# Patient Record
Sex: FEMALE | Race: WHITE | HISPANIC OR LATINO | URBAN - METROPOLITAN AREA
[De-identification: names, ages, dates, MRNs, and addresses within clinical notes are randomized per-mention and may not be internally consistent; named-entity substitution may affect disease eponyms.]

---

## 2022-12-21 ENCOUNTER — INPATIENT (INPATIENT)
Facility: HOSPITAL | Age: 27
LOS: 1 days | Discharge: HOME | End: 2022-12-23
Attending: SPECIALIST | Admitting: SPECIALIST
Payer: COMMERCIAL

## 2022-12-21 VITALS — DIASTOLIC BLOOD PRESSURE: 67 MMHG | HEART RATE: 90 BPM | SYSTOLIC BLOOD PRESSURE: 125 MMHG

## 2022-12-21 DIAGNOSIS — O26.899 OTHER SPECIFIED PREGNANCY RELATED CONDITIONS, UNSPECIFIED TRIMESTER: ICD-10-CM

## 2022-12-21 LAB
ALBUMIN SERPL ELPH-MCNC: 2.5 G/DL — LOW (ref 3.3–5)
ALBUMIN SERPL ELPH-MCNC: 3.3 G/DL — SIGNIFICANT CHANGE UP (ref 3.3–5)
ALP SERPL-CCNC: 192 U/L — HIGH (ref 40–120)
ALP SERPL-CCNC: 258 U/L — HIGH (ref 40–120)
ALT FLD-CCNC: 7 U/L — SIGNIFICANT CHANGE UP (ref 4–33)
ALT FLD-CCNC: <5 U/L — LOW (ref 4–33)
AMPHET UR-MCNC: NEGATIVE — SIGNIFICANT CHANGE UP
ANION GAP SERPL CALC-SCNC: 11 MMOL/L — SIGNIFICANT CHANGE UP (ref 7–14)
ANION GAP SERPL CALC-SCNC: 14 MMOL/L — SIGNIFICANT CHANGE UP (ref 7–14)
APPEARANCE UR: ABNORMAL
APTT BLD: 25.3 SEC — LOW (ref 27–36.3)
APTT BLD: 27.5 SEC — SIGNIFICANT CHANGE UP (ref 27–36.3)
AST SERPL-CCNC: 16 U/L — SIGNIFICANT CHANGE UP (ref 4–32)
AST SERPL-CCNC: 17 U/L — SIGNIFICANT CHANGE UP (ref 4–32)
BACTERIA # UR AUTO: ABNORMAL
BARBITURATES UR SCN-MCNC: NEGATIVE — SIGNIFICANT CHANGE UP
BASOPHILS # BLD AUTO: 0.01 K/UL — SIGNIFICANT CHANGE UP (ref 0–0.2)
BASOPHILS # BLD AUTO: 0.01 K/UL — SIGNIFICANT CHANGE UP (ref 0–0.2)
BASOPHILS NFR BLD AUTO: 0.1 % — SIGNIFICANT CHANGE UP (ref 0–2)
BASOPHILS NFR BLD AUTO: 0.1 % — SIGNIFICANT CHANGE UP (ref 0–2)
BENZODIAZ UR-MCNC: NEGATIVE — SIGNIFICANT CHANGE UP
BILIRUB SERPL-MCNC: 0.4 MG/DL — SIGNIFICANT CHANGE UP (ref 0.2–1.2)
BILIRUB SERPL-MCNC: 0.4 MG/DL — SIGNIFICANT CHANGE UP (ref 0.2–1.2)
BILIRUB UR-MCNC: NEGATIVE — SIGNIFICANT CHANGE UP
BLD GP AB SCN SERPL QL: NEGATIVE — SIGNIFICANT CHANGE UP
BUN SERPL-MCNC: 6 MG/DL — LOW (ref 7–23)
BUN SERPL-MCNC: 8 MG/DL — SIGNIFICANT CHANGE UP (ref 7–23)
CALCIUM SERPL-MCNC: 8.5 MG/DL — SIGNIFICANT CHANGE UP (ref 8.4–10.5)
CALCIUM SERPL-MCNC: 8.9 MG/DL — SIGNIFICANT CHANGE UP (ref 8.4–10.5)
CHLORIDE SERPL-SCNC: 107 MMOL/L — SIGNIFICANT CHANGE UP (ref 98–107)
CHLORIDE SERPL-SCNC: 111 MMOL/L — HIGH (ref 98–107)
CO2 SERPL-SCNC: 15 MMOL/L — LOW (ref 22–31)
CO2 SERPL-SCNC: 18 MMOL/L — LOW (ref 22–31)
COCAINE METAB.OTHER UR-MCNC: NEGATIVE — SIGNIFICANT CHANGE UP
COLOR SPEC: ABNORMAL
COVID-19 SPIKE DOMAIN AB INTERP: POSITIVE
COVID-19 SPIKE DOMAIN ANTIBODY RESULT: >250 U/ML — HIGH
CREAT ?TM UR-MCNC: 112 MG/DL — SIGNIFICANT CHANGE UP
CREAT SERPL-MCNC: 0.53 MG/DL — SIGNIFICANT CHANGE UP (ref 0.5–1.3)
CREAT SERPL-MCNC: 0.6 MG/DL — SIGNIFICANT CHANGE UP (ref 0.5–1.3)
CREATININE URINE RESULT, DAU: 112 MG/DL — SIGNIFICANT CHANGE UP
DIFF PNL FLD: ABNORMAL
EGFR: 125 ML/MIN/1.73M2 — SIGNIFICANT CHANGE UP
EGFR: 129 ML/MIN/1.73M2 — SIGNIFICANT CHANGE UP
EOSINOPHIL # BLD AUTO: 0 K/UL — SIGNIFICANT CHANGE UP (ref 0–0.5)
EOSINOPHIL # BLD AUTO: 0.01 K/UL — SIGNIFICANT CHANGE UP (ref 0–0.5)
EOSINOPHIL NFR BLD AUTO: 0 % — SIGNIFICANT CHANGE UP (ref 0–6)
EOSINOPHIL NFR BLD AUTO: 0.1 % — SIGNIFICANT CHANGE UP (ref 0–6)
EPI CELLS # UR: 13 /HPF — HIGH (ref 0–5)
FIBRINOGEN PPP-MCNC: 506 MG/DL — HIGH (ref 200–465)
GLUCOSE SERPL-MCNC: 141 MG/DL — HIGH (ref 70–99)
GLUCOSE SERPL-MCNC: 99 MG/DL — SIGNIFICANT CHANGE UP (ref 70–99)
GLUCOSE UR QL: NEGATIVE — SIGNIFICANT CHANGE UP
HBV SURFACE AG SERPL QL IA: SIGNIFICANT CHANGE UP
HCT VFR BLD CALC: 28 % — LOW (ref 34.5–45)
HCT VFR BLD CALC: 36.9 % — SIGNIFICANT CHANGE UP (ref 34.5–45)
HGB BLD-MCNC: 12 G/DL — SIGNIFICANT CHANGE UP (ref 11.5–15.5)
HGB BLD-MCNC: 9.3 G/DL — LOW (ref 11.5–15.5)
HIV 1+2 AB+HIV1 P24 AG SERPL QL IA: SIGNIFICANT CHANGE UP
HYALINE CASTS # UR AUTO: 0 /LPF — SIGNIFICANT CHANGE UP (ref 0–7)
IANC: 4.37 K/UL — SIGNIFICANT CHANGE UP (ref 1.8–7.4)
IANC: 9.46 K/UL — HIGH (ref 1.8–7.4)
IMM GRANULOCYTES NFR BLD AUTO: 0.4 % — SIGNIFICANT CHANGE UP (ref 0–0.9)
IMM GRANULOCYTES NFR BLD AUTO: 0.5 % — SIGNIFICANT CHANGE UP (ref 0–0.9)
INR BLD: 0.97 RATIO — SIGNIFICANT CHANGE UP (ref 0.88–1.16)
INR BLD: 1 RATIO — SIGNIFICANT CHANGE UP (ref 0.88–1.16)
KETONES UR-MCNC: NEGATIVE — SIGNIFICANT CHANGE UP
LDH SERPL L TO P-CCNC: 209 U/L — SIGNIFICANT CHANGE UP (ref 135–225)
LDH SERPL L TO P-CCNC: 210 U/L — SIGNIFICANT CHANGE UP (ref 135–225)
LEUKOCYTE ESTERASE UR-ACNC: ABNORMAL
LYMPHOCYTES # BLD AUTO: 1.8 K/UL — SIGNIFICANT CHANGE UP (ref 1–3.3)
LYMPHOCYTES # BLD AUTO: 14.9 % — SIGNIFICANT CHANGE UP (ref 13–44)
LYMPHOCYTES # BLD AUTO: 2.45 K/UL — SIGNIFICANT CHANGE UP (ref 1–3.3)
LYMPHOCYTES # BLD AUTO: 32.8 % — SIGNIFICANT CHANGE UP (ref 13–44)
MCHC RBC-ENTMCNC: 27.8 PG — SIGNIFICANT CHANGE UP (ref 27–34)
MCHC RBC-ENTMCNC: 27.8 PG — SIGNIFICANT CHANGE UP (ref 27–34)
MCHC RBC-ENTMCNC: 32.5 GM/DL — SIGNIFICANT CHANGE UP (ref 32–36)
MCHC RBC-ENTMCNC: 33.2 GM/DL — SIGNIFICANT CHANGE UP (ref 32–36)
MCV RBC AUTO: 83.8 FL — SIGNIFICANT CHANGE UP (ref 80–100)
MCV RBC AUTO: 85.4 FL — SIGNIFICANT CHANGE UP (ref 80–100)
METHADONE UR-MCNC: NEGATIVE — SIGNIFICANT CHANGE UP
MONOCYTES # BLD AUTO: 0.6 K/UL — SIGNIFICANT CHANGE UP (ref 0–0.9)
MONOCYTES # BLD AUTO: 0.73 K/UL — SIGNIFICANT CHANGE UP (ref 0–0.9)
MONOCYTES NFR BLD AUTO: 6.1 % — SIGNIFICANT CHANGE UP (ref 2–14)
MONOCYTES NFR BLD AUTO: 8 % — SIGNIFICANT CHANGE UP (ref 2–14)
NEUTROPHILS # BLD AUTO: 4.37 K/UL — SIGNIFICANT CHANGE UP (ref 1.8–7.4)
NEUTROPHILS # BLD AUTO: 9.46 K/UL — HIGH (ref 1.8–7.4)
NEUTROPHILS NFR BLD AUTO: 58.5 % — SIGNIFICANT CHANGE UP (ref 43–77)
NEUTROPHILS NFR BLD AUTO: 78.5 % — HIGH (ref 43–77)
NITRITE UR-MCNC: NEGATIVE — SIGNIFICANT CHANGE UP
NRBC # BLD: 0 /100 WBCS — SIGNIFICANT CHANGE UP (ref 0–0)
NRBC # BLD: 0 /100 WBCS — SIGNIFICANT CHANGE UP (ref 0–0)
NRBC # FLD: 0 K/UL — SIGNIFICANT CHANGE UP (ref 0–0)
NRBC # FLD: 0 K/UL — SIGNIFICANT CHANGE UP (ref 0–0)
OPIATES UR-MCNC: NEGATIVE — SIGNIFICANT CHANGE UP
OXYCODONE UR-MCNC: NEGATIVE — SIGNIFICANT CHANGE UP
PCP SPEC-MCNC: SIGNIFICANT CHANGE UP
PCP UR-MCNC: NEGATIVE — SIGNIFICANT CHANGE UP
PH UR: 6 — SIGNIFICANT CHANGE UP (ref 5–8)
PLATELET # BLD AUTO: 146 K/UL — LOW (ref 150–400)
PLATELET # BLD AUTO: 175 K/UL — SIGNIFICANT CHANGE UP (ref 150–400)
POTASSIUM SERPL-MCNC: 3.6 MMOL/L — SIGNIFICANT CHANGE UP (ref 3.5–5.3)
POTASSIUM SERPL-MCNC: 3.9 MMOL/L — SIGNIFICANT CHANGE UP (ref 3.5–5.3)
POTASSIUM SERPL-SCNC: 3.6 MMOL/L — SIGNIFICANT CHANGE UP (ref 3.5–5.3)
POTASSIUM SERPL-SCNC: 3.9 MMOL/L — SIGNIFICANT CHANGE UP (ref 3.5–5.3)
PROT ?TM UR-MCNC: 23 MG/DL — SIGNIFICANT CHANGE UP
PROT SERPL-MCNC: 5.5 G/DL — LOW (ref 6–8.3)
PROT SERPL-MCNC: 6.9 G/DL — SIGNIFICANT CHANGE UP (ref 6–8.3)
PROT UR-MCNC: ABNORMAL
PROT/CREAT UR-RTO: 0.2 RATIO — SIGNIFICANT CHANGE UP (ref 0–0.2)
PROTHROM AB SERPL-ACNC: 11.2 SEC — SIGNIFICANT CHANGE UP (ref 10.5–13.4)
PROTHROM AB SERPL-ACNC: 11.6 SEC — SIGNIFICANT CHANGE UP (ref 10.5–13.4)
RBC # BLD: 3.34 M/UL — LOW (ref 3.8–5.2)
RBC # BLD: 4.32 M/UL — SIGNIFICANT CHANGE UP (ref 3.8–5.2)
RBC # FLD: 14.7 % — HIGH (ref 10.3–14.5)
RBC # FLD: 14.9 % — HIGH (ref 10.3–14.5)
RBC CASTS # UR COMP ASSIST: 425 /HPF — HIGH (ref 0–4)
RH IG SCN BLD-IMP: POSITIVE — SIGNIFICANT CHANGE UP
RH IG SCN BLD-IMP: POSITIVE — SIGNIFICANT CHANGE UP
SARS-COV-2 IGG+IGM SERPL QL IA: >250 U/ML — HIGH
SARS-COV-2 IGG+IGM SERPL QL IA: POSITIVE
SARS-COV-2 RNA SPEC QL NAA+PROBE: SIGNIFICANT CHANGE UP
SODIUM SERPL-SCNC: 136 MMOL/L — SIGNIFICANT CHANGE UP (ref 135–145)
SODIUM SERPL-SCNC: 140 MMOL/L — SIGNIFICANT CHANGE UP (ref 135–145)
SP GR SPEC: 1.02 — SIGNIFICANT CHANGE UP (ref 1.01–1.05)
T PALLIDUM AB TITR SER: NEGATIVE — SIGNIFICANT CHANGE UP
THC UR QL: NEGATIVE — SIGNIFICANT CHANGE UP
URATE SERPL-MCNC: 7 MG/DL — SIGNIFICANT CHANGE UP (ref 2.5–7)
URATE SERPL-MCNC: 7.4 MG/DL — HIGH (ref 2.5–7)
UROBILINOGEN FLD QL: SIGNIFICANT CHANGE UP
WBC # BLD: 12.05 K/UL — HIGH (ref 3.8–10.5)
WBC # BLD: 7.48 K/UL — SIGNIFICANT CHANGE UP (ref 3.8–10.5)
WBC # FLD AUTO: 12.05 K/UL — HIGH (ref 3.8–10.5)
WBC # FLD AUTO: 7.48 K/UL — SIGNIFICANT CHANGE UP (ref 3.8–10.5)
WBC UR QL: 14 /HPF — HIGH (ref 0–5)

## 2022-12-21 PROCEDURE — 59409 OBSTETRICAL CARE: CPT | Mod: U9

## 2022-12-21 PROCEDURE — 59025 FETAL NON-STRESS TEST: CPT | Mod: 26

## 2022-12-21 PROCEDURE — 76815 OB US LIMITED FETUS(S): CPT | Mod: 26

## 2022-12-21 RX ORDER — SODIUM CHLORIDE 9 MG/ML
300 INJECTION INTRAMUSCULAR; INTRAVENOUS; SUBCUTANEOUS ONCE
Refills: 0 | Status: COMPLETED | OUTPATIENT
Start: 2022-12-21 | End: 2022-12-21

## 2022-12-21 RX ORDER — HYDROCORTISONE 1 %
1 OINTMENT (GRAM) TOPICAL EVERY 6 HOURS
Refills: 0 | Status: DISCONTINUED | OUTPATIENT
Start: 2022-12-21 | End: 2022-12-23

## 2022-12-21 RX ORDER — INFLUENZA VIRUS VACCINE 15; 15; 15; 15 UG/.5ML; UG/.5ML; UG/.5ML; UG/.5ML
0.5 SUSPENSION INTRAMUSCULAR ONCE
Refills: 0 | Status: DISCONTINUED | OUTPATIENT
Start: 2022-12-21 | End: 2022-12-23

## 2022-12-21 RX ORDER — SODIUM CHLORIDE 9 MG/ML
1000 INJECTION, SOLUTION INTRAVENOUS ONCE
Refills: 0 | Status: DISCONTINUED | OUTPATIENT
Start: 2022-12-21 | End: 2022-12-21

## 2022-12-21 RX ORDER — CHLORHEXIDINE GLUCONATE 213 G/1000ML
1 SOLUTION TOPICAL ONCE
Refills: 0 | Status: DISCONTINUED | OUTPATIENT
Start: 2022-12-21 | End: 2022-12-21

## 2022-12-21 RX ORDER — MAGNESIUM HYDROXIDE 400 MG/1
30 TABLET, CHEWABLE ORAL
Refills: 0 | Status: DISCONTINUED | OUTPATIENT
Start: 2022-12-21 | End: 2022-12-23

## 2022-12-21 RX ORDER — SODIUM CHLORIDE 9 MG/ML
1000 INJECTION, SOLUTION INTRAVENOUS ONCE
Refills: 0 | Status: COMPLETED | OUTPATIENT
Start: 2022-12-21 | End: 2022-12-21

## 2022-12-21 RX ORDER — OXYTOCIN 10 UNIT/ML
333.33 VIAL (ML) INJECTION
Qty: 20 | Refills: 0 | Status: DISCONTINUED | OUTPATIENT
Start: 2022-12-21 | End: 2022-12-23

## 2022-12-21 RX ORDER — IBUPROFEN 200 MG
600 TABLET ORAL EVERY 6 HOURS
Refills: 0 | Status: DISCONTINUED | OUTPATIENT
Start: 2022-12-21 | End: 2022-12-23

## 2022-12-21 RX ORDER — ACETAMINOPHEN 500 MG
975 TABLET ORAL
Refills: 0 | Status: DISCONTINUED | OUTPATIENT
Start: 2022-12-21 | End: 2022-12-23

## 2022-12-21 RX ORDER — SODIUM CHLORIDE 9 MG/ML
1000 INJECTION INTRAMUSCULAR; INTRAVENOUS; SUBCUTANEOUS
Refills: 0 | Status: DISCONTINUED | OUTPATIENT
Start: 2022-12-21 | End: 2022-12-23

## 2022-12-21 RX ORDER — LANOLIN
1 OINTMENT (GRAM) TOPICAL EVERY 6 HOURS
Refills: 0 | Status: DISCONTINUED | OUTPATIENT
Start: 2022-12-21 | End: 2022-12-23

## 2022-12-21 RX ORDER — SODIUM CHLORIDE 9 MG/ML
1000 INJECTION, SOLUTION INTRAVENOUS
Refills: 0 | Status: DISCONTINUED | OUTPATIENT
Start: 2022-12-21 | End: 2022-12-21

## 2022-12-21 RX ORDER — OXYCODONE HYDROCHLORIDE 5 MG/1
5 TABLET ORAL
Refills: 0 | Status: DISCONTINUED | OUTPATIENT
Start: 2022-12-21 | End: 2022-12-23

## 2022-12-21 RX ORDER — DIPHENHYDRAMINE HCL 50 MG
25 CAPSULE ORAL EVERY 6 HOURS
Refills: 0 | Status: DISCONTINUED | OUTPATIENT
Start: 2022-12-21 | End: 2022-12-23

## 2022-12-21 RX ORDER — DIBUCAINE 1 %
1 OINTMENT (GRAM) RECTAL EVERY 6 HOURS
Refills: 0 | Status: DISCONTINUED | OUTPATIENT
Start: 2022-12-21 | End: 2022-12-23

## 2022-12-21 RX ORDER — TETANUS TOXOID, REDUCED DIPHTHERIA TOXOID AND ACELLULAR PERTUSSIS VACCINE, ADSORBED 5; 2.5; 8; 8; 2.5 [IU]/.5ML; [IU]/.5ML; UG/.5ML; UG/.5ML; UG/.5ML
0.5 SUSPENSION INTRAMUSCULAR ONCE
Refills: 0 | Status: DISCONTINUED | OUTPATIENT
Start: 2022-12-21 | End: 2022-12-23

## 2022-12-21 RX ORDER — KETOROLAC TROMETHAMINE 30 MG/ML
30 SYRINGE (ML) INJECTION ONCE
Refills: 0 | Status: DISCONTINUED | OUTPATIENT
Start: 2022-12-21 | End: 2022-12-23

## 2022-12-21 RX ORDER — SIMETHICONE 80 MG/1
80 TABLET, CHEWABLE ORAL EVERY 4 HOURS
Refills: 0 | Status: DISCONTINUED | OUTPATIENT
Start: 2022-12-21 | End: 2022-12-23

## 2022-12-21 RX ORDER — PRAMOXINE HYDROCHLORIDE 150 MG/15G
1 AEROSOL, FOAM RECTAL EVERY 4 HOURS
Refills: 0 | Status: DISCONTINUED | OUTPATIENT
Start: 2022-12-21 | End: 2022-12-23

## 2022-12-21 RX ORDER — CITRIC ACID/SODIUM CITRATE 300-500 MG
15 SOLUTION, ORAL ORAL EVERY 6 HOURS
Refills: 0 | Status: DISCONTINUED | OUTPATIENT
Start: 2022-12-21 | End: 2022-12-21

## 2022-12-21 RX ORDER — AER TRAVELER 0.5 G/1
1 SOLUTION RECTAL; TOPICAL EVERY 4 HOURS
Refills: 0 | Status: DISCONTINUED | OUTPATIENT
Start: 2022-12-21 | End: 2022-12-23

## 2022-12-21 RX ORDER — OXYCODONE HYDROCHLORIDE 5 MG/1
5 TABLET ORAL ONCE
Refills: 0 | Status: DISCONTINUED | OUTPATIENT
Start: 2022-12-21 | End: 2022-12-23

## 2022-12-21 RX ORDER — IBUPROFEN 200 MG
600 TABLET ORAL EVERY 6 HOURS
Refills: 0 | Status: COMPLETED | OUTPATIENT
Start: 2022-12-21 | End: 2023-11-19

## 2022-12-21 RX ORDER — SODIUM CHLORIDE 9 MG/ML
3 INJECTION INTRAMUSCULAR; INTRAVENOUS; SUBCUTANEOUS EVERY 8 HOURS
Refills: 0 | Status: DISCONTINUED | OUTPATIENT
Start: 2022-12-21 | End: 2022-12-23

## 2022-12-21 RX ORDER — BENZOCAINE 10 %
1 GEL (GRAM) MUCOUS MEMBRANE EVERY 6 HOURS
Refills: 0 | Status: DISCONTINUED | OUTPATIENT
Start: 2022-12-21 | End: 2022-12-23

## 2022-12-21 RX ADMIN — SODIUM CHLORIDE 1000 MILLILITER(S): 9 INJECTION, SOLUTION INTRAVENOUS at 12:45

## 2022-12-21 RX ADMIN — SODIUM CHLORIDE 600 MILLILITER(S): 9 INJECTION INTRAMUSCULAR; INTRAVENOUS; SUBCUTANEOUS at 12:43

## 2022-12-21 RX ADMIN — SODIUM CHLORIDE 3 MILLILITER(S): 9 INJECTION INTRAMUSCULAR; INTRAVENOUS; SUBCUTANEOUS at 15:17

## 2022-12-21 RX ADMIN — SODIUM CHLORIDE 3 MILLILITER(S): 9 INJECTION INTRAMUSCULAR; INTRAVENOUS; SUBCUTANEOUS at 22:00

## 2022-12-21 NOTE — OB PROVIDER DELIVERY SUMMARY - NSLOWPPHRISK_OBGYN_A_OB
No previous uterine incision/Perez Pregnancy/Less than or equal to 4 previous vaginal births/No known bleeding disorder/No history of postpartum hemorrhage

## 2022-12-21 NOTE — OB RN TRIAGE NOTE - FALL HARM RISK - UNIVERSAL INTERVENTIONS
Bed in lowest position, wheels locked, appropriate side rails in place/Call bell, personal items and telephone in reach/Instruct patient to call for assistance before getting out of bed or chair/Non-slip footwear when patient is out of bed/[place] to call system/Physically safe environment - no spills, clutter or unnecessary equipment/Purposeful Proactive Rounding/Room/bathroom lighting operational, light cord in reach

## 2022-12-21 NOTE — OB PROVIDER H&P - PROBLEM SELECTOR PLAN 1
Admit for Labor at 40wks Admit for Labor at 40wks  D/W Dr. Griffin  Routine orders  Prenatal Labs Sent  GBS negative in HIE  Epidural for pain management  Expectant Management  Covid Swabbed   U Tox Sent

## 2022-12-21 NOTE — OB PROVIDER TRIAGE NOTE - HISTORY OF PRESENT ILLNESS
28y/o  Default @40.6wks presents with painful contractions felt every 7mins, pain scale 10/10.   Reports good fetal movement  Denies LOF/VB  [facility] Patient     Allergies: Denies  Medications: PNV    Denies Medical and Surgical HX  Denies Etoh/Smoke/Drugs/Psy

## 2022-12-21 NOTE — OB NEONATOLOGY/PEDIATRICIAN DELIVERY SUMMARY - NSPEDSNEONOTESA_OBGYN_ALL_OB_FT
Peds called for Category II tracing. 40.6 wk male born via  to a 28 y/o  blood type O+ mother. No significant maternal or prenatal history. PNL -/-/NR/I, GBS - on . AROM at 08:47 () with clear fluids, approx. 4.5 hrs. Baby emerged vigorous, crying, was w/d/s/s with APGARS of 9/9. Delayed cord clamping at 60 seconds. Mom plans to initiate breastfeeding and formula feed, consents circ.  EOS 0.10. Maternal COVID negative. Baby is stable and admitted to NBN.

## 2022-12-21 NOTE — OB RN PATIENT PROFILE - NS_OBGYNHISTORY_OBGYN_ALL_OB_FT
GYN: Cliff  OB:   10/20/2014  7#      AP course uncomplicated  -[facility] Patient  -Patient reports getting monthly GTT testing??  -GBS negative as per patient

## 2022-12-21 NOTE — OB PROVIDER DELIVERY SUMMARY - NSSELHIDDEN_OBGYN_ALL_OB_FT
[NS_DeliveryAttending1_OBGYN_ALL_OB_FT:[id number]],[NS_DeliveryAssist1_OBGYN_ALL_OB_FT:[id number]],[NS_DeliveryAssist2_OBGYN_ALL_OB_FT:[id number]=]

## 2022-12-21 NOTE — PROVIDER CONTACT NOTE (OTHER) - ASSESSMENT
laying: /68 HR 79  sittin/70 HR 81  standin/64   Pt denied any lightheadedness, dizziness, or unwell feelings.

## 2022-12-21 NOTE — OB RN DELIVERY SUMMARY - NS_SEPSISRSKCALC_OBGYN_ALL_OB_FT
EOS calculated successfully. EOS Risk Factor: 0.5/1000 live births ([facility] national incidence); GA=40w6d; Temp=98.6; ROM=4.517; GBS='Unknown'; Antibiotics='No antibiotics or any antibiotics < 2 hrs prior to birth'

## 2022-12-21 NOTE — OB PROVIDER DELIVERY SUMMARY - NSPROVIDERDELIVERYNOTE_OBGYN_ALL_OB_FT
Head delivered in OA. Restituted in YUDI. No nuchal. Anterior shoulder was delivered followed by the posterior shoulder and remainder of the body.     Infant passed to the mother. Cord clamping was delayed for 1 minute. Cord clamped and cut. Infant handed to pediatrics at the bedside given category II tracing. Cord gasses obtained via a segment of cord.     Placenta was delivered spontaneously intact. Uterine massage was performed and Oxytocin was given upon delivery of the placenta. Intermittent atony ameliorated w/ bimanual massage and evacuation of clots. Fundus firm at conclusion of delivery.     First degree laceration repaired w/ 2-0 chromic in a running fashion. B/l barrington-urethral repaired w/ interrupted 3-0 Vicryl    Adequate hemostasis. QBL 301cc  Count correct x2.

## 2022-12-21 NOTE — OB PROVIDER TRIAGE NOTE - NS_OBGYNHISTORY_OBGYN_ALL_OB_FT
GYN: Cliff  OB:   10/20/2014  7#      AP course uncomplicated  -[facility] Patient  -Patient reports getting monthly GTT testing??  -GBS negative as per patient

## 2022-12-21 NOTE — OB PROVIDER H&P - NS ATTEND AMEND GEN_ALL_CORE FT
Attending Note   Agree with above   PT presents in labor   PNC at [facility]   Admit for labor   epidural prn   pitocin prn     R Gaby SALGUERO

## 2022-12-21 NOTE — OB RN DELIVERY SUMMARY - NSSELHIDDEN_OBGYN_ALL_OB_FT
[NS_DeliveryAttending1_OBGYN_ALL_OB_FT:[id number]],[NS_DeliveryAssist1_OBGYN_ALL_OB_FT:[id number]],[NS_DeliveryAssist2_OBGYN_ALL_OB_FT:[id number]],[NS_DeliveryRN_OBGYN_ALL_OB_FT:[id number]],[NS_CirculateRN2_OBGYN_ALL_OB_FT:[id number]=]

## 2022-12-21 NOTE — OB PROVIDER LABOR PROGRESS NOTE - ASSESSMENT
Cont current management   Pt repositioned  Consider Pitocin augmentation with next exam if spontaneous labor does not progress  ORLANDO Steele
Active labor  Overall reassuring fetal status  Cont current management  Reassess in 2 hrs/prn    ORLANDO Steele
#Labor   - Will manage expectantly    #Fetal Wellbeing   - Cat 2. IUPC placed as above. Will amnioinfusion  - Will bolus and reposition    #Pain Control   - w/ epi    Bismark Arguello, PGY-1    seen and evaluate w/ Dr. Lu

## 2022-12-21 NOTE — OB PROVIDER LABOR PROGRESS NOTE - NS_SUBJECTIVE/OBJECTIVE_OBGYN_ALL_OB_FT
PGY1 Labor & Delivery Progress Note (Entry delayed secondary to clinical duties)     Pt examined @ 1242 due to cat2 tracing    T(C): 36.8 (12-21-22 @ 11:24), Max: 37.0 (12-21-22 @ 02:46)  HR: 90 (12-21-22 @ 13:57) (60 - 133)  BP: 123/59 (12-21-22 @ 13:47) (94/49 - 154/87)  RR: 19 (12-21-22 @ 06:41) (16 - 19)  SpO2: 98% (12-21-22 @ 13:57) (77% - 100%)
Pt seen and examined for labor progress, comfortable with occasional pressure.  VSS  Cat 2 FHR, 130 bpm with nonrecurrently mild variable decelerations in the setting of moderate variability and accelerations  [place]: 4-5  VE: 6-7/80/-2
Pt seen and examined for labor progress, s/p epidural placement and mostly comfortable, some pressure  VSS  Cat 1   [employer]: q2-6  VE: 5/80/-2, AROM for clear fluid

## 2022-12-21 NOTE — OB PROVIDER H&P - NSHPPHYSICALEXAM_GEN_ALL_CORE
Vital Signs Last 24 Hrs  T(C): 37 (21 Dec 2022 02:52), Max: 37.0 (21 Dec 2022 02:46)  T(F): 98.6 (21 Dec 2022 02:52), Max: 98.6 (21 Dec 2022 02:46)  HR: 94 (21 Dec 2022 03:46) (75 - 94)  BP: 129/76 (21 Dec 2022 03:46) (125/67 - 129/76)  RR: 16 (21 Dec 2022 02:52) (16 - 16)    Assessment reveals VSS  General: Female sitting comfortably in no apparent distress  Neuro: No facial asymmetry, no slurred speech, moves all 4 extremities  Mood: Alert and lucid, appropriate mood and affect  A&Ox3  Lungs- clear bilateral  Heart- normal rate and regular rhythm  Extremities- Warm, Dry, no edema present, good pulses   Abdomen soft, NT, gravid  Cat 1 tracing, ctx every 2-8  Transabdominal Ultrasound- vtx  Vaginal Exam- 4.5/80/-3        PLAN: Admit for Labor

## 2022-12-21 NOTE — OB PROVIDER TRIAGE NOTE - NSHPPHYSICALEXAM_GEN_ALL_CORE
Vital Signs Last 24 Hrs  T(C): 37 (21 Dec 2022 02:52), Max: 37.0 (21 Dec 2022 02:46)  T(F): 98.6 (21 Dec 2022 02:52), Max: 98.6 (21 Dec 2022 02:46)  HR: 94 (21 Dec 2022 03:46) (75 - 94)  BP: 129/76 (21 Dec 2022 03:46) (125/67 - 129/76)  RR: 16 (21 Dec 2022 02:52) (16 - 16)    Assessment reveals VSS  General: Female sitting comfortably in no apparent distress  Neuro: No facial asymmetry, no slurred speech, moves all 4 extremities  Mood: Alert and lucid, appropriate mood and affect  A&Ox3  Lungs- clear bilateral  Heart- normal rate and regular rhythm  Extremities- Warm, Dry, no edema present, good pulses   Abdomen soft, NT, gravid  Cat 1 tracing, ctx every 2-8  Transabdominal Ultrasound- vtx  Vaginal Exam- 3/80/-3        PLAN: Re Examine in 2 Hours

## 2022-12-21 NOTE — OB PROVIDER H&P - NS_OBGYNHISTORY_OBGYN_ALL_OB_FT
GYN: Cliff  OB:   10/20/2014  7#      AP course uncomplicated  -[facility] Patient  -Patient reports getting monthly GTT testing??  -GBS negative as per patient

## 2022-12-21 NOTE — OB RN PATIENT PROFILE - FALL HARM RISK - UNIVERSAL INTERVENTIONS
Bed in lowest position, wheels locked, appropriate side rails in place/Call bell, personal items and telephone in reach/Instruct patient to call for assistance before getting out of bed or chair/Non-slip footwear when patient is out of bed/[place] to call system/Physically safe environment - no spills, clutter or unnecessary equipment/Purposeful Proactive Rounding/Room/bathroom lighting operational, light cord in reach

## 2022-12-22 ENCOUNTER — TRANSCRIPTION ENCOUNTER (OUTPATIENT)
Age: 27
End: 2022-12-22

## 2022-12-22 LAB
COVID-19 SPIKE DOMAIN AB INTERP: POSITIVE
COVID-19 SPIKE DOMAIN ANTIBODY RESULT: >250 U/ML — HIGH
HCT VFR BLD CALC: 27.4 % — LOW (ref 34.5–45)
HGB BLD-MCNC: 9 G/DL — LOW (ref 11.5–15.5)
MCHC RBC-ENTMCNC: 27.4 PG — SIGNIFICANT CHANGE UP (ref 27–34)
MCHC RBC-ENTMCNC: 32.8 GM/DL — SIGNIFICANT CHANGE UP (ref 32–36)
MCV RBC AUTO: 83.5 FL — SIGNIFICANT CHANGE UP (ref 80–100)
NRBC # BLD: 0 /100 WBCS — SIGNIFICANT CHANGE UP (ref 0–0)
NRBC # FLD: 0 K/UL — SIGNIFICANT CHANGE UP (ref 0–0)
PLATELET # BLD AUTO: 154 K/UL — SIGNIFICANT CHANGE UP (ref 150–400)
RBC # BLD: 3.28 M/UL — LOW (ref 3.8–5.2)
RBC # FLD: 15.2 % — HIGH (ref 10.3–14.5)
RUBV IGG SER-ACNC: 2.7 INDEX — SIGNIFICANT CHANGE UP
RUBV IGG SER-IMP: POSITIVE — SIGNIFICANT CHANGE UP
SARS-COV-2 IGG+IGM SERPL QL IA: >250 U/ML — HIGH
SARS-COV-2 IGG+IGM SERPL QL IA: POSITIVE
WBC # BLD: 9.12 K/UL — SIGNIFICANT CHANGE UP (ref 3.8–10.5)
WBC # FLD AUTO: 9.12 K/UL — SIGNIFICANT CHANGE UP (ref 3.8–10.5)

## 2022-12-22 RX ORDER — ACETAMINOPHEN 500 MG
3 TABLET ORAL
Qty: 0 | Refills: 0 | DISCHARGE
Start: 2022-12-22

## 2022-12-22 RX ORDER — IBUPROFEN 200 MG
1 TABLET ORAL
Qty: 0 | Refills: 0 | DISCHARGE
Start: 2022-12-22

## 2022-12-22 RX ADMIN — SODIUM CHLORIDE 3 MILLILITER(S): 9 INJECTION INTRAMUSCULAR; INTRAVENOUS; SUBCUTANEOUS at 16:47

## 2022-12-22 NOTE — DISCHARGE NOTE OB - CARE PLAN
1 Principal Discharge DX:	Vaginal delivery  Assessment and plan of treatment:	After discharge, please stay on pelvic rest for 6 weeks, meaning no sexual intercourse, no tampons and no douching.  No driving for 2 weeks as women can loose a lot of blood during delivery and there is a possibility of being lightheaded/fainting.  No lifting objects heavier than baby for two weeks.  Expect to have vaginal bleeding/spotting for up to six weeks. The bleeding should get lighter and more white/light brown with time.  For bleeding soaking more than a pad an hour or passing clots greater than the size of your fist, come in to the emergency department.    Follow up with your doctor in 6 weeks.  Secondary Diagnosis:	Gestational hypertension  Assessment and plan of treatment:	Please obtain a blood pressure cuff. A prescription was sent to your pharmacy. Take your blood pressure two times a day at the same time. Call the clinic if your blood pressure if greater than 140 systolic (top number) and/or 90 diastolic (bottom number). Please go to the hospital if the systolic (top number) is 160 or greater and/or if the diastolic (bottom number) is 110 or greater. Follow-up with your Ob/Gyn in 2-3 days for a blood pressure check    Call the clinic and/or go the hospital if you experience a headache not relieved by medication, severe abdominal pain, new onset changes in vision, worsening of lower extremity swelling or edema.

## 2022-12-22 NOTE — DISCHARGE NOTE OB - PLAN OF CARE
After discharge, please stay on pelvic rest for 6 weeks, meaning no sexual intercourse, no tampons and no douching.  No driving for 2 weeks as women can loose a lot of blood during delivery and there is a possibility of being lightheaded/fainting.  No lifting objects heavier than baby for two weeks.  Expect to have vaginal bleeding/spotting for up to six weeks. The bleeding should get lighter and more white/light brown with time.  For bleeding soaking more than a pad an hour or passing clots greater than the size of your fist, come in to the emergency department.    Follow up with your doctor in 6 weeks. Please obtain a blood pressure cuff. A prescription was sent to your pharmacy. Take your blood pressure two times a day at the same time. Call the clinic if your blood pressure if greater than 140 systolic (top number) and/or 90 diastolic (bottom number). Please go to the hospital if the systolic (top number) is 160 or greater and/or if the diastolic (bottom number) is 110 or greater. Follow-up with your Ob/Gyn in 2-3 days for a blood pressure check    Call the clinic and/or go the hospital if you experience a headache not relieved by medication, severe abdominal pain, new onset changes in vision, worsening of lower extremity swelling or edema.

## 2022-12-22 NOTE — CHART NOTE - NSCHARTNOTEFT_GEN_A_CORE
Delayed documentation 2/2 to clinical duties  Patient seen approximately 9pm on  due to results of STAT CBC with H/H 9.3/28.0    Patient with failed orthostatics on L&D by HR, per RN repeat orthostatics wnl - not documented in chart    Upon arrival to room, patient resting comfortably holding .  Endorses feeling well.  Denies lightheadedness, dizziness, chest pain, shortness of breath, palpitations.  Reports she was asymptomatic as well when orthostatics were taken on L&D.      ICU Vital Signs Last 24 Hrs  T(C): 36.4 (22 Dec 2022 02:00), Max: 37.1 (21 Dec 2022 19:20)  T(F): 97.6 (22 Dec 2022 02:00), Max: 98.8 (21 Dec 2022 19:20)  HR: 80 (22 Dec 2022 02:00) (60 - 134)  BP: 125/70 (22 Dec 2022 02:00) (94/49 - 160/81)  BP(mean): --  ABP: --  ABP(mean): --  RR: 17 (22 Dec 2022 02:00) (16 - 19)  SpO2: 100% (22 Dec 2022 02:00) (77% - 100%)    O2 Parameters below as of 22 Dec 2022 02:00  Patient On (Oxygen Delivery Method): room air        Exam:  Gen: NAD  CV: RRR  Lungs: CTAB  Abd: soft, nondistended, nontender to palpation, fundus firm  : minimal bleeding noted on pad                 9.3    12.05 )-----------( 146      (  @ 18:32 )             28.0                12.0   7.48  )-----------( 175      ( 12-21 @ 06:05 )             36.9       A/P: Patient is PPD#0 from ,  with drop in H/H on CBC  - Asymptomatic at this time  - Repeat CBC in AM  - Patient to alert staff if symptoms develop    Discussed with Dr. Pierce, Service Attending  RILEY Lancaster PGY1
1410    Health records obtained and reviewed. Notable for the following:  - Abnormal 3hr GTT w/ x1 abnormal value  - Asymptomatic bacteruria in 2022 for which she did not want to take abx. Negative Ucx thereafter in July  - Non-adherent to care between 21-31wga    Patient does not want a circumcision for her     Bismark Arguello  PGY-1, Obstetrics & Gynecology

## 2022-12-22 NOTE — DISCHARGE NOTE OB - HOSPITAL COURSE
Patient was admitted to L+D for labor ***    , Pt had an uncomplicated  followed by an uncomplicated postpartum course.  EBL:  Hct:  On Postpartum day 2, patient was discharged home in stable condition, voiding spontaneously, pain well controlled, ambulating, tolerating PO and with normal vital signs. Patient's postpartum birth control plan is ___________. Pt plans to follow up in the [facility] in 6 weeks. Telephone number and clinic information provided prior to discharge.  Patient was admitted to L+D for labor. Of note, patient had received her PNC elsewhere for which a Utox was obtained and was negative. Patient met criteria for gHTN postpartum.   EBL:  Hct:  On Postpartum day 2, patient was discharged home in stable condition, voiding spontaneously, pain well controlled, ambulating, tolerating PO and with normal vital signs. Patient's postpartum birth control plan is *** Pt plans to follow up in the [facility] in 6 weeks. Telephone number and clinic information provided prior to discharge.  Patient was admitted to L+D for labor. Of note, patient had received her PNC elsewhere for which a Utox was obtained and was negative. Patient met criteria for gHTN postpartum.   QBL: 301cc  Hct: 36.9->28->27.4  On Postpartum day 2, patient was discharged home in stable condition, voiding spontaneously, pain well controlled, ambulating, tolerating PO and with normal vital signs. Defer postpartum contraception. Pt plans to follow up in the [facility] in 6 weeks w/ BP check next week. Telephone number and clinic information provided prior to discharge.

## 2022-12-22 NOTE — PROGRESS NOTE ADULT - ASSESSMENT
A/P: 26yo PPD#1 s/p  c/b gHTN. Patient is stable and doing well post-partum.   - Pain well controlled, continue current pain regimen  - Increase ambulation  - Continue regular diet  - Will follow-up AM CBC. Patient asymptomatic at time of evaluation this AM and VS are reassuring    #gHTN  - Asx  - BPs 125-139/70-80s  - Given the opportunity to ask/clarify diagnosis. All questions were answered to the patient's apparent satisfaction     #Post-partum  - Is still undecided about contraception     Bismark Arguello, PGY-1  Ob/Gyn

## 2022-12-22 NOTE — DISCHARGE NOTE OB - MEDICATION SUMMARY - MEDICATIONS TO TAKE
I will START or STAY ON the medications listed below when I get home from the hospital:    Blood pressure cuff   -- 1 application implant 2 times a day   -- Indication: For Gestational hypertension    ibuprofen 600 mg oral tablet  -- 1 tab(s) by mouth every 6 hours  -- Indication: For Pain    acetaminophen 325 mg oral tablet  -- 3 tab(s) by mouth every 6 hours  -- Indication: For Pain    ferrous sulfate 325 mg (65 mg elemental iron) oral tablet  -- 1 tab(s) by mouth 3 times a day  -- Indication: For Blood count    PNV Prenatal oral tablet  -- 1 tab(s) by mouth once a day  -- Indication: For Recovery

## 2022-12-22 NOTE — DISCHARGE NOTE OB - PATIENT PORTAL LINK FT
You can access the FollowMyHealth Patient Portal offered by [facility] by registering at the following website: [email or web address]. By joining [facility]’s FollowMyHealth portal, you will also be able to view your health information using other applications (apps) compatible with our system.

## 2022-12-22 NOTE — DISCHARGE NOTE OB - COMMUNITY RESOURCE CONTACT NUMBER:
Patient to call and schedule baby's first visit appointment at [facility] Division of General Pediatrics [address], [address], [facility]  [phone] so that baby is evaluated by pediatrician 1 to 2 days after hospital discharge.

## 2022-12-22 NOTE — DISCHARGE NOTE OB - COMMUNITY RESOURCE NAME:
Patient to call and schedule postpartum visit for 4 to 6 weeks after delivery date  at Ambulatory care Unit-Oncology Building, Level C, [facility] [phone].

## 2022-12-22 NOTE — DISCHARGE NOTE OB - NSDCCONDITION_GEN_ALL_CORE
INCOMPLETE      SURGERY PA NOTE ON BEHALF OF DR. OCASIO / GENERAL SURGERY:    S: Patient seen and examined at bedside.  Patient still without much of an appetite.      MEDICATIONS:  amoxicillin  875 milliGRAM(s)/clavulanate 1 Tablet(s) Oral every 12 hours  metoprolol tartrate 12.5 milliGRAM(s) Oral two times a day  pantoprazole  Injectable 40 milliGRAM(s) IV Push daily  polyethylene glycol 3350 17 Gram(s) Oral daily  risperiDONE   Tablet 2 milliGRAM(s) Oral at bedtime  senna 2 Tablet(s) Oral at bedtime      O:  Vital Signs Last 24 Hrs  T(C): 36.7 (07 Oct 2021 06:13), Max: 36.7 (07 Oct 2021 06:13)  T(F): 98 (07 Oct 2021 06:13), Max: 98 (07 Oct 2021 06:13)  HR: 86 (07 Oct 2021 06:13) (76 - 86)  BP: 109/65 (07 Oct 2021 06:13) (109/65 - 118/68)  BP(mean): --  RR: 18 (07 Oct 2021 06:13) (18 - 18)  SpO2: 94% (07 Oct 2021 06:13) (94% - 95%)    I&O SUMMARY:    10-06-21 @ 07:01  -  10-07-21 @ 07:00  --------------------------------------------------------  IN: 200 mL / OUT: 1000 mL / NET: -800 mL        PHYSICAL EXAM:  Lungs: CTA bilat without W/R/R  Card: S1S2  Abd: Soft, NT, ND.  +BS x 4.  No rebound/guarding.    Ext: Calves soft, NT, without edema bilat    LABS:                        9.9    5.98  )-----------( 156      ( 07 Oct 2021 11:08 )             31.1     10-07    144  |  113<H>  |  8   ----------------------------<  158<H>  3.6   |  24  |  1.40<H>    Ca    7.9<L>      07 Oct 2021 11:08  Phos  2.6     10-06  Mg     1.9     10-06    TPro  5.0<L>  /  Alb  2.1<L>  /  TBili  0.4  /  DBili  x   /  AST  16  /  ALT  9<L>  /  AlkPhos  33<L>  10-06              RADIOLOGY:    A:    P:       SURGERY PA NOTE ON BEHALF OF DR. OCASIO / GENERAL SURGERY:    S: Patient seen and examined at bedside.  Patient still without much of an appetite, though denies any abdominal pain, nausea, or vomiting.  Has not had a bm since admission, denies flatus.  Urinating without issue.  Currently still on IV Zosyn.  Denies fevers, chills, diaphoresis.      MEDICATIONS:  amoxicillin  875 milliGRAM(s)/clavulanate 1 Tablet(s) Oral every 12 hours  metoprolol tartrate 12.5 milliGRAM(s) Oral two times a day  pantoprazole  Injectable 40 milliGRAM(s) IV Push daily  polyethylene glycol 3350 17 Gram(s) Oral daily  risperiDONE   Tablet 2 milliGRAM(s) Oral at bedtime  senna 2 Tablet(s) Oral at bedtime      O:  Vital Signs Last 24 Hrs  T(C): 36.7 (07 Oct 2021 06:13), Max: 36.7 (07 Oct 2021 06:13)  T(F): 98 (07 Oct 2021 06:13), Max: 98 (07 Oct 2021 06:13)  HR: 86 (07 Oct 2021 06:13) (76 - 86)  BP: 109/65 (07 Oct 2021 06:13) (109/65 - 118/68)  RR: 18 (07 Oct 2021 06:13) (18 - 18)  SpO2: 94% (07 Oct 2021 06:13) (94% - 95%)    I&O SUMMARY:    10-06-21 @ 07:01  -  10-07-21 @ 07:00  --------------------------------------------------------  IN: 200 mL / OUT: 1000 mL / NET: -800 mL    PHYSICAL EXAM:  Lungs: CTA bilat without W/R/R  Card: S1S2  Abd: Soft, NT over RUQ or elsewhere throughout, ND.  +BS x 4.  No rebound/guarding.    Ext: Calves soft, NT, without edema bilat    LABS:                        9.9    5.98  )-----------( 156      ( 07 Oct 2021 11:08 )             31.1     10-07    144  |  113<H>  |  8   ----------------------------<  158<H>  3.6   |  24  |  1.40<H>    Ca    7.9<L>      07 Oct 2021 11:08  Phos  2.6     10-06  Mg     1.9     10-06    TPro  5.0<L>  /  Alb  2.1<L>  /  TBili  0.4  /  DBili  x   /  AST  16  /  ALT  9<L>  /  AlkPhos  33<L>  10-06      A:  89-yo male with cholelithiasis, gallbladder wall thickening  HIDA positive for acute cholecystitis       P:  Patient remains asymptomatic, though not with much appetite   WBC remains normal, remains afebrile with stable VS  Continue diet, continue abx per ID  Holding off from any surgical or invasive modalities at this time - will continue abx  Transition to PO abx today - Augmentin 875 BID  Adding Miralax to bowel regimen  Anticipating d/c (possibly with services?) to home 10/8 or 10/9  Patient seen with Dr. Ocasio today   Will follow        SURGERY PA NOTE ON BEHALF OF DR. OCASIO / GENERAL SURGERY:    S: Patient seen and examined at bedside.  Patient still without much of an appetite, though denies any abdominal pain, nausea, or vomiting.  Has not had a bm since admission, denies flatus.  Urinating without issue.  Currently still on IV Zosyn.  Denies fevers, chills, diaphoresis.        MEDICATIONS:  amoxicillin  875 milliGRAM(s)/clavulanate 1 Tablet(s) Oral every 12 hours  metoprolol tartrate 12.5 milliGRAM(s) Oral two times a day  pantoprazole  Injectable 40 milliGRAM(s) IV Push daily  polyethylene glycol 3350 17 Gram(s) Oral daily  risperiDONE   Tablet 2 milliGRAM(s) Oral at bedtime  senna 2 Tablet(s) Oral at bedtime      O:  Vital Signs Last 24 Hrs  T(C): 36.7 (07 Oct 2021 06:13), Max: 36.7 (07 Oct 2021 06:13)  T(F): 98 (07 Oct 2021 06:13), Max: 98 (07 Oct 2021 06:13)  HR: 86 (07 Oct 2021 06:13) (76 - 86)  BP: 109/65 (07 Oct 2021 06:13) (109/65 - 118/68)  RR: 18 (07 Oct 2021 06:13) (18 - 18)  SpO2: 94% (07 Oct 2021 06:13) (94% - 95%)      I&O SUMMARY:    10-06-21 @ 07:01  -  10-07-21 @ 07:00  --------------------------------------------------------  IN: 200 mL / OUT: 1000 mL / NET: -800 mL      PHYSICAL EXAM:  Lungs: CTA bilat without W/R/R  Card: S1S2  Abd: Soft, NT over RUQ or elsewhere throughout, ND.  +BS x 4.  No rebound/guarding.    Ext: Calves soft, NT, without edema bilat      LABS:                        9.9    5.98  )-----------( 156      ( 07 Oct 2021 11:08 )             31.1     10-07    144  |  113<H>  |  8   ----------------------------<  158<H>  3.6   |  24  |  1.40<H>    Ca    7.9<L>      07 Oct 2021 11:08  Phos  2.6     10-06  Mg     1.9     10-06    TPro  5.0<L>  /  Alb  2.1<L>  /  TBili  0.4  /  DBili  x   /  AST  16  /  ALT  9<L>  /  AlkPhos  33<L>  10-06      A:  89-yo male with cholelithiasis, gallbladder wall thickening  HIDA positive for acute cholecystitis       P:  Patient remains asymptomatic, though not with much appetite   WBC remains normal, remains afebrile with stable VS  Continue diet, continue abx per ID  Holding off from any surgical or invasive modalities at this time - will continue abx  Transition to PO abx today - Augmentin 875 BID  Adding Miralax to bowel regimen  Anticipating d/c (possibly with services?) to home 10/8 or 10/9  Patient seen with Dr. Ocasio today   Will follow        Stable

## 2022-12-22 NOTE — DISCHARGE NOTE OB - NS MD DC FALL RISK RISK
For information on Fall & Injury Prevention, visit: [email or web address] OR  [email or web address] OR  https://www.cdc.gov/steadi/patient.html

## 2022-12-22 NOTE — DISCHARGE NOTE OB - MATERIALS PROVIDED
Vaccinations/[facility]  Screening Program/  Immunization Record/Breastfeeding Mother’s Support Group Information/Guide to Postpartum Care/[facility] Hearing Screen Program/Back To Sleep Handout/Shaken Baby Prevention Handout/Breastfeeding Guide and Packet/Birth Certificate Instructions/Tdap Vaccination (VIS Pub Date: 2012)

## 2022-12-22 NOTE — PROGRESS NOTE ADULT - ATTENDING COMMENTS
Associate Chief of L & D (Late entry)   I have met this patient for the first time today.  She is a default from [facility] and was admitted by Dr Reese Sebastian in early labor and delivered by dr Lu     OB Progress Note:  PPD#1    S: 28yo  PPD#1 s/p . Patient denies anuy HA, blurred vision or RUQ tenderness    O:  Vitals:  Vital Signs Last 24 Hrs  T(C): 36.8 (22 Dec 2022 10:00), Max: 37.1 (21 Dec 2022 19:20)  T(F): 98.2 (22 Dec 2022 10:00), Max: 98.8 (21 Dec 2022 19:20)  HR: 95 (22 Dec 2022 10:00) (74 - 134)  BP: 132/74 (22 Dec 2022 10:00) (105/47 - 160/81)  RR: 18 (22 Dec 2022 10:00) (16 - 18)  SpO2: 100% (22 Dec 2022 10:00) (77% - 100%)    Parameters below as of 22 Dec 2022 10:00  Patient On (Oxygen Delivery Method): room air        MEDICATIONS  (STANDING):  acetaminophen     Tablet .. 975 milliGRAM(s) Oral <User Schedule>  diphtheria/tetanus/pertussis (acellular) Vaccine (Adacel) 0.5 milliLiter(s) IntraMuscular once  ibuprofen  Tablet. 600 milliGRAM(s) Oral every 6 hours  influenza   Vaccine 0.5 milliLiter(s) IntraMuscular once  ketorolac   Injectable 30 milliGRAM(s) IV Push once  oxytocin Infusion 333.333 milliUNIT(s)/Min (1000 mL/Hr) IV Continuous <Continuous>  oxytocin Infusion 333.333 milliUNIT(s)/Min (1000 mL/Hr) IV Continuous <Continuous>  prenatal multivitamin 1 Tablet(s) Oral daily  sodium chloride 0.9% lock flush 3 milliLiter(s) IV Push every 8 hours  sodium chloride 0.9%. 1000 milliLiter(s) (125 mL/Hr) IV Continuous <Continuous>      Labs:  Blood type: O Positive  Rubella IgG: RPR: Negative                          9.0<L>   9.12 >-----------< 154    (  @ 05:43 )             27.4<L>                        9.3<L>   12.05<H> >-----------< 146<L>    (  @ 18:32 )             28.0<L>                        12.0   7.48 >-----------< 175    (  @ 06:05 )             36.9    22 @ 18:48      140  |  111<H>  |  6<L>  ----------------------------<  141<H>  3.6   |  15<L>  |  0.53    22 @ 06:05      136  |  107  |  8   ----------------------------<  99  3.9   |  18<L>  |  0.60        Ca    8.5      21 Dec 2022 18:48  Ca    8.9      21 Dec 2022 06:05    TPro  5.5<L>  /  Alb  2.5<L>  /  TBili  0.4  /  DBili  x   /  AST  16  /  ALT  <5<L>  /  AlkPhos  192<H>  22 @ 18:48  TPro  6.9  /  Alb  3.3  /  TBili  0.4  /  DBili  x   /  AST  17  /  ALT  7   /  AlkPhos  258<H>  22 @ 06:05          Physical Exam:    Abdomen: soft, non-tender, non-distended, fundus firm  Vaginal: Lochia scant sutures in situ  Extremities: No erythema/edema    A/P: 28yo PPD#1 s/p  and repair of 1st degree laceration and bilateral periurethral laceration complicated by GHTN    - Pain well controlled, continue current pain regimen  - Increase ambulation, SCDs when not ambulating  - Continue regular diet  - Monitor BP closely    Vero Triana M.D., M.B.A., M.S.

## 2022-12-22 NOTE — DISCHARGE NOTE OB - PROVIDER TOKENS
FREE:[LAST:[[facility]],PHONE:[(   )    -],FAX:[(   )    -],ADDRESS:[[facility], Ambulatory Care Unit  Oncology Building, Basement floor  [address] [place] [address]  [phone]]]

## 2022-12-23 VITALS
TEMPERATURE: 99 F | SYSTOLIC BLOOD PRESSURE: 142 MMHG | RESPIRATION RATE: 17 BRPM | DIASTOLIC BLOOD PRESSURE: 79 MMHG | OXYGEN SATURATION: 98 % | HEART RATE: 90 BPM

## 2022-12-23 RX ORDER — FERROUS SULFATE 325(65) MG
325 TABLET ORAL THREE TIMES A DAY
Refills: 0 | Status: DISCONTINUED | OUTPATIENT
Start: 2022-12-23 | End: 2022-12-23

## 2022-12-23 RX ORDER — SENNA PLUS 8.6 MG/1
1 TABLET ORAL
Refills: 0 | Status: DISCONTINUED | OUTPATIENT
Start: 2022-12-23 | End: 2022-12-23

## 2022-12-23 RX ORDER — FERROUS SULFATE 325(65) MG
1 TABLET ORAL
Qty: 0 | Refills: 0 | DISCHARGE
Start: 2022-12-23

## 2022-12-23 RX ORDER — ASCORBIC ACID 60 MG
500 TABLET,CHEWABLE ORAL DAILY
Refills: 0 | Status: DISCONTINUED | OUTPATIENT
Start: 2022-12-23 | End: 2022-12-23

## 2022-12-23 NOTE — PROGRESS NOTE ADULT - SUBJECTIVE AND OBJECTIVE BOX
OB Progress Note:  PPD#2    S: 27y PPD#2 s/p  c/b gHTN. Pain controlled. Patient tolerating regular diet, voiding spontaneously, and ambulating without difficulty. Denies significant vaginal bleeding, chest pain, shortness of breath, light-headedness/ dizziness, or n/v. Denies any headaches or scotomas    O:  Vitals:   Vital Signs Last 24 Hrs  T(C): 36.4 (23 Dec 2022 06:00), Max: 36.8 (22 Dec 2022 10:00)  T(F): 97.6 (23 Dec 2022 06:00), Max: 98.2 (22 Dec 2022 10:00)  HR: 76 (23 Dec 2022 06:00) (76 - 98)  BP: 128/69 (23 Dec 2022 06:00) (124/74 - 132/74)  BP(mean): --  RR: 18 (23 Dec 2022 06:00) (17 - 18)  SpO2: 99% (23 Dec 2022 06:00) (99% - 100%)    Parameters below as of 23 Dec 2022 06:00  Patient On (Oxygen Delivery Method): room air        MEDICATIONS  (STANDING):  acetaminophen     Tablet .. 975 milliGRAM(s) Oral <User Schedule>  ascorbic acid 500 milliGRAM(s) Oral daily  diphtheria/tetanus/pertussis (acellular) Vaccine (Adacel) 0.5 milliLiter(s) IntraMuscular once  ferrous    sulfate 325 milliGRAM(s) Oral three times a day  ibuprofen  Tablet. 600 milliGRAM(s) Oral every 6 hours  influenza   Vaccine 0.5 milliLiter(s) IntraMuscular once  ketorolac   Injectable 30 milliGRAM(s) IV Push once  prenatal multivitamin 1 Tablet(s) Oral daily  senna 1 Tablet(s) Oral two times a day  sodium chloride 0.9% lock flush 3 milliLiter(s) IV Push every 8 hours    MEDICATIONS  (PRN):  benzocaine 20%/menthol 0.5% Spray 1 Spray(s) Topical every 6 hours PRN for Perineal discomfort  dibucaine 1% Ointment 1 Application(s) Topical every 6 hours PRN Perineal discomfort  diphenhydrAMINE 25 milliGRAM(s) Oral every 6 hours PRN Pruritus  hydrocortisone 1% Cream 1 Application(s) Topical every 6 hours PRN Moderate Pain (4-6)  lanolin Ointment 1 Application(s) Topical every 6 hours PRN nipple soreness  magnesium hydroxide Suspension 30 milliLiter(s) Oral two times a day PRN Constipation  oxyCODONE    IR 5 milliGRAM(s) Oral every 3 hours PRN Moderate to Severe Pain (4-10)  oxyCODONE    IR 5 milliGRAM(s) Oral once PRN Moderate to Severe Pain (4-10)  pramoxine 1%/zinc 5% Cream 1 Application(s) Topical every 4 hours PRN Moderate Pain (4-6)  simethicone 80 milliGRAM(s) Chew every 4 hours PRN Gas  witch hazel Pads 1 Application(s) Topical every 4 hours PRN Perineal discomfort      Labs:  Blood type: O Positive  Rubella IgG: RPR: Negative                          9.0<L>   9.12 >-----------< 154    (  @ 05:43 )             27.4<L>                        9.3<L>   12.05<H> >-----------< 146<L>    (  @ 18:32 )             28.0<L>                        12.0   7.48 >-----------< 175    (  @ 06:05 )             36.9    22 @ 18:48      140  |  111<H>  |  6<L>  ----------------------------<  141<H>  3.6   |  15<L>  |  0.53    22 @ 06:05      136  |  107  |  8   ----------------------------<  99  3.9   |  18<L>  |  0.60        Ca    8.5      21 Dec 2022 18:48  Ca    8.9      21 Dec 2022 06:05    TPro  5.5<L>  /  Alb  2.5<L>  /  TBili  0.4  /  DBili  x   /  AST  16  /  ALT  <5<L>  /  AlkPhos  192<H>  22 @ 18:48  TPro  6.9  /  Alb  3.3  /  TBili  0.4  /  DBili  x   /  AST  17  /  ALT  7   /  AlkPhos  258<H>  12--22 @ 06:05          Physical Exam:  General: No acute distress  Respiratory: No respiratory distress. Unlabored breathing   Abdomen: Soft. Non-tender. Non-distended. Fundus is firm   Extremities: No pitting edema or calf tenderness bilaterally      
OB Progress Note:  PPD#1    S: 28yo PPD#1 s/p  c/b gHTN. Pain controlled. Patient voiding spontaneously and ambulating. Reports not much of an appetite, but has tolerated a regular diet w/o issue. Denies significant VB, chest pain, shortness of breath, n/v, light-headedness/dizziness. Denies any headache or scotomas    Of note, was evaluated overnight after HELLP labs returned notable for drop in CBC. Patient was asymptomatic at time of eval       O:  Vitals:  Vital Signs Last 24 Hrs  T(C): 36.4 (22 Dec 2022 02:00), Max: 37.1 (21 Dec 2022 19:20)  T(F): 97.6 (22 Dec 2022 02:00), Max: 98.8 (21 Dec 2022 19:20)  HR: 80 (22 Dec 2022 02:00) (60 - 134)  BP: 125/70 (22 Dec 2022 02:00) (94/49 - 160/81)  BP(mean): --  RR: 17 (22 Dec 2022 02:00) (17 - 19)  SpO2: 100% (22 Dec 2022 02:00) (77% - 100%)    Parameters below as of 22 Dec 2022 02:00  Patient On (Oxygen Delivery Method): room air        MEDICATIONS  (STANDING):  acetaminophen     Tablet .. 975 milliGRAM(s) Oral <User Schedule>  diphtheria/tetanus/pertussis (acellular) Vaccine (Adacel) 0.5 milliLiter(s) IntraMuscular once  ibuprofen  Tablet. 600 milliGRAM(s) Oral every 6 hours  influenza   Vaccine 0.5 milliLiter(s) IntraMuscular once  ketorolac   Injectable 30 milliGRAM(s) IV Push once  oxytocin Infusion 333.333 milliUNIT(s)/Min (1000 mL/Hr) IV Continuous <Continuous>  oxytocin Infusion 333.333 milliUNIT(s)/Min (1000 mL/Hr) IV Continuous <Continuous>  prenatal multivitamin 1 Tablet(s) Oral daily  sodium chloride 0.9% lock flush 3 milliLiter(s) IV Push every 8 hours  sodium chloride 0.9%. 1000 milliLiter(s) (125 mL/Hr) IV Continuous <Continuous>      Labs:  Blood type: O Positive  Rubella IgG: RPR: Negative                          9.3<L>   12.05<H> >-----------< 146<L>    (  @ 18:32 )             28.0<L>                        12.0   7.48 >-----------< 175    (  @ 06:05 )             36.9    22 @ 18:48      140  |  111<H>  |  6<L>  ----------------------------<  141<H>  3.6   |  15<L>  |  0.53    22 @ 06:05      136  |  107  |  8   ----------------------------<  99  3.9   |  18<L>  |  0.60        Ca    8.5      21 Dec 2022 18:48  Ca    8.9      21 Dec 2022 06:05    TPro  5.5<L>  /  Alb  2.5<L>  /  TBili  0.4  /  DBili  x   /  AST  16  /  ALT  <5<L>  /  AlkPhos  192<H>  22 @ 18:48  TPro  6.9  /  Alb  3.3  /  TBili  0.4  /  DBili  x   /  AST  17  /  ALT  7   /  AlkPhos  258<H>  22 @ 06:05          Physical Exam:  General: No acute distress. Resting in bed comfortably with   Respiratory: No respiratory distress. Unlabored breathing   Abdomen: Soft. Non-tender. Non-distended. Fundus is firm  Extremities: No calf tenderness bilaterally

## 2022-12-23 NOTE — PROGRESS NOTE ADULT - ATTENDING COMMENTS
Associate Chief of L & D (Late entry)      OB Progress Note:  PPD#2    S: 28yo  PPD#2 s/p . Patient denies anuy HA, blurred vision or RUQ tenderness    O:  Vital Signs Last 24 Hrs  T(C): 36.9 (23 Dec 2022 10:10), Max: 36.9 (23 Dec 2022 10:10)  T(F): 98.4 (23 Dec 2022 10:10), Max: 98.4 (23 Dec 2022 10:10)  HR: 85 (23 Dec 2022 10:10) (76 - 98)  BP: 131/67 (23 Dec 2022 10:10) (124/74 - 131/67)  RR: 18 (23 Dec 2022 10:10) (17 - 18)  SpO2: 100% (23 Dec 2022 10:10) (99% - 100%)    Parameters below as of 23 Dec 2022 10:10  Patient On (Oxygen Delivery Method): room air      MEDICATIONS  (STANDING):  acetaminophen     Tablet .. 975 milliGRAM(s) Oral <User Schedule>  diphtheria/tetanus/pertussis (acellular) Vaccine (Adacel) 0.5 milliLiter(s) IntraMuscular once  ibuprofen  Tablet. 600 milliGRAM(s) Oral every 6 hours  influenza   Vaccine 0.5 milliLiter(s) IntraMuscular once  ketorolac   Injectable 30 milliGRAM(s) IV Push once  oxytocin Infusion 333.333 milliUNIT(s)/Min (1000 mL/Hr) IV Continuous <Continuous>  oxytocin Infusion 333.333 milliUNIT(s)/Min (1000 mL/Hr) IV Continuous <Continuous>  prenatal multivitamin 1 Tablet(s) Oral daily  sodium chloride 0.9% lock flush 3 milliLiter(s) IV Push every 8 hours  sodium chloride 0.9%. 1000 milliLiter(s) (125 mL/Hr) IV Continuous <Continuous>      Labs:  Blood type: O Positive  Rubella IgG: RPR: Negative                          9.0<L>   9.12 >-----------< 154    (  @ 05:43 )             27.4<L>                        9.3<L>   12.05<H> >-----------< 146<L>    (  @ 18:32 )             28.0<L>                        12.0   7.48 >-----------< 175    (  @ 06:05 )             36.9    22 @ 18:48      140  |  111<H>  |  6<L>  ----------------------------<  141<H>  3.6   |  15<L>  |  0.53    22 @ 06:05      136  |  107  |  8   ----------------------------<  99  3.9   |  18<L>  |  0.60        Ca    8.5      21 Dec 2022 18:48  Ca    8.9      21 Dec 2022 06:05    TPro  5.5<L>  /  Alb  2.5<L>  /  TBili  0.4  /  DBili  x   /  AST  16  /  ALT  <5<L>  /  AlkPhos  192<H>  22 @ 18:48  TPro  6.9  /  Alb  3.3  /  TBili  0.4  /  DBili  x   /  AST  17  /  ALT  7   /  AlkPhos  258<H>  22 @ 06:05          Physical Exam:    Abdomen: soft, non-tender, non-distended, fundus firm  Vaginal: Lochia scant sutures in situ  Extremities: No erythema/+1 edema    A/P: 28yo PPD#2 s/p  and repair of 1st degree laceration and bilateral periurethral laceration complicated by GHTN    - Patient is stable for discharge and will follow up in the PP clinic  - Monitor BP closely    Vero Triana M.D., M.B.A., M.S. Stable  Cont Iron supplement stable Stable  Cont Iron supplement Stable  Cont Iron supplement stable stable stable stable stable

## 2022-12-23 NOTE — PROGRESS NOTE ADULT - ASSESSMENT
A/P: 26yo PPD#2 s/p  c/b gHTN. Patient is stable and doing well post-partum.    - Pain well controlled, continue current pain regimen  - Increase ambulation  - Continue regular diet    #gHTN  - Asx  - BPs 110-132/60-80s  - Given the opportunity to ask questions regarding dx and follow-up. All questions answered to the patient's apparent satisfaction. BP cuff sent to pharmacy of choice    #Post-partum  - Amendable for discharge today  - Decided to defer contraception  - Wants to follow-up with clinic at [facility]. Information to be provided w/ dc paperwork     Bismark Arguello, PGY-1  Ob/Gyn

## 2023-01-01 ENCOUNTER — RESULT REVIEW (OUTPATIENT)
Age: 28
End: 2023-01-01

## 2023-01-04 PROBLEM — U07.1 COVID-19: Chronic | Status: ACTIVE | Noted: 2022-12-21

## 2023-01-09 PROBLEM — Z00.00 ENCOUNTER FOR PREVENTIVE HEALTH EXAMINATION: Status: ACTIVE | Noted: 2023-01-09

## 2023-01-20 ENCOUNTER — OUTPATIENT (OUTPATIENT)
Dept: OUTPATIENT SERVICES | Facility: HOSPITAL | Age: 28
LOS: 1 days | End: 2023-01-20

## 2023-01-20 ENCOUNTER — RESULT REVIEW (OUTPATIENT)
Age: 28
End: 2023-01-20

## 2023-01-20 ENCOUNTER — APPOINTMENT (OUTPATIENT)
Dept: OBGYN | Facility: HOSPITAL | Age: 28
End: 2023-01-20

## 2023-01-20 VITALS
DIASTOLIC BLOOD PRESSURE: 71 MMHG | WEIGHT: 227.3 LBS | HEIGHT: 65 IN | SYSTOLIC BLOOD PRESSURE: 110 MMHG | HEART RATE: 72 BPM | BODY MASS INDEX: 37.87 KG/M2 | TEMPERATURE: 97.7 F

## 2023-01-20 DIAGNOSIS — Z30.011 ENCOUNTER FOR INITIAL PRESCRIPTION OF CONTRACEPTIVE PILLS: ICD-10-CM

## 2023-01-20 DIAGNOSIS — K59.00 CONSTIPATION, UNSPECIFIED: ICD-10-CM

## 2023-01-20 LAB
BASOPHILS # BLD AUTO: 0.01 K/UL — SIGNIFICANT CHANGE UP (ref 0–0.2)
BASOPHILS NFR BLD AUTO: 0.2 % — SIGNIFICANT CHANGE UP (ref 0–2)
EOSINOPHIL # BLD AUTO: 0.05 K/UL — SIGNIFICANT CHANGE UP (ref 0–0.5)
EOSINOPHIL NFR BLD AUTO: 0.8 % — SIGNIFICANT CHANGE UP (ref 0–6)
HCT VFR BLD CALC: 34 % — LOW (ref 34.5–45)
HGB BLD-MCNC: 10.8 G/DL — LOW (ref 11.5–15.5)
IANC: 2.87 K/UL — SIGNIFICANT CHANGE UP (ref 1.8–7.4)
IMM GRANULOCYTES NFR BLD AUTO: 0.2 % — SIGNIFICANT CHANGE UP (ref 0–0.9)
LYMPHOCYTES # BLD AUTO: 2.85 K/UL — SIGNIFICANT CHANGE UP (ref 1–3.3)
LYMPHOCYTES # BLD AUTO: 45.3 % — HIGH (ref 13–44)
MCHC RBC-ENTMCNC: 26.3 PG — LOW (ref 27–34)
MCHC RBC-ENTMCNC: 31.8 GM/DL — LOW (ref 32–36)
MCV RBC AUTO: 82.7 FL — SIGNIFICANT CHANGE UP (ref 80–100)
MONOCYTES # BLD AUTO: 0.5 K/UL — SIGNIFICANT CHANGE UP (ref 0–0.9)
MONOCYTES NFR BLD AUTO: 7.9 % — SIGNIFICANT CHANGE UP (ref 2–14)
NEUTROPHILS # BLD AUTO: 2.87 K/UL — SIGNIFICANT CHANGE UP (ref 1.8–7.4)
NEUTROPHILS NFR BLD AUTO: 45.6 % — SIGNIFICANT CHANGE UP (ref 43–77)
NRBC # BLD: 0 /100 WBCS — SIGNIFICANT CHANGE UP (ref 0–0)
NRBC # FLD: 0 K/UL — SIGNIFICANT CHANGE UP (ref 0–0)
PLATELET # BLD AUTO: 283 K/UL — SIGNIFICANT CHANGE UP (ref 150–400)
RBC # BLD: 4.11 M/UL — SIGNIFICANT CHANGE UP (ref 3.8–5.2)
RBC # FLD: 14.4 % — SIGNIFICANT CHANGE UP (ref 10.3–14.5)
WBC # BLD: 6.29 K/UL — SIGNIFICANT CHANGE UP (ref 3.8–10.5)
WBC # FLD AUTO: 6.29 K/UL — SIGNIFICANT CHANGE UP (ref 3.8–10.5)

## 2023-01-20 NOTE — HISTORY OF PRESENT ILLNESS
[Postpartum Follow Up] : postpartum follow up [Delivery Date: ___] : on [unfilled] [] : delivered by vaginal delivery [Male] : Delivery History: baby boy [Wt. ___] : weighing [unfilled] [Discharge HCT: ___] : hematocrit level was [unfilled] [Discharge HGB: ___] : hemoglobin level was [unfilled] [Intended Contraception] : Intended Contraception: [Oral Contraceptives] : oral contraceptives [Back to Normal] : is back to normal in size [None] : no vaginal bleeding [Normal] : the vagina was normal [Examination Of The Breasts] : breasts are normal [Doing Well] : is doing well [Resumed Menses] : has not resumed her menses [Resumed Gervais] : has not resumed intercourse [S/Sx PP Depression] : no signs/symptoms of postpartum depression [FreeTextEntry8] : Here for postpartum exam. Prenatal Care at [facility].   Hx   full term 7 lbs [de-identified] : Rx Lessina x 4 months- instructed on use and risks. Considering IUD- info given. [de-identified] : Considering IUD- info given, Urine GC/chlamydia ordered.Bottlefeeding/happy with baby.CBC today. States she kept taking sugar tests at [facility] but was not diabetic. HgA1c today. Contraceptive counseling- wants OCPs. Rx Lessina 1 pack x 4 months, Instructed on use and risks, Complaining of constipation with occasional bloody stool- referred to GI clinic. RTC 3 months family planning. MHegarty NP

## 2023-01-21 LAB
C TRACH RRNA SPEC QL NAA+PROBE: SIGNIFICANT CHANGE UP
N GONORRHOEA RRNA SPEC QL NAA+PROBE: SIGNIFICANT CHANGE UP
SPECIMEN SOURCE: SIGNIFICANT CHANGE UP

## 2023-01-24 DIAGNOSIS — K59.00 CONSTIPATION, UNSPECIFIED: ICD-10-CM

## 2023-01-24 DIAGNOSIS — Z30.011 ENCOUNTER FOR INITIAL PRESCRIPTION OF CONTRACEPTIVE PILLS: ICD-10-CM

## 2023-01-24 DIAGNOSIS — D64.9 ANEMIA, UNSPECIFIED: ICD-10-CM

## 2023-03-21 RX ORDER — LEVONORGESTREL AND ETHINYL ESTRADIOL 0.1-0.02MG
0.1-2 KIT ORAL DAILY
Qty: 1 | Refills: 1 | Status: ACTIVE | COMMUNITY
Start: 2023-01-20 | End: 1900-01-01

## 2023-04-07 NOTE — OB PROVIDER H&P - HISTORY OF PRESENT ILLNESS
Vaccine status unknown
28y/o  Default @40.6wks presents with painful contractions felt every 7mins, pain scale 10/10.   Reports good fetal movement  Denies LOF/VB  [facility] Patient     Allergies: Denies  Medications: PNV    Denies Medical and Surgical HX  Denies Etoh/Smoke/Drugs/Psy

## 2023-05-18 ENCOUNTER — OUTPATIENT (OUTPATIENT)
Dept: OUTPATIENT SERVICES | Facility: HOSPITAL | Age: 28
LOS: 1 days | End: 2023-05-18

## 2023-05-18 ENCOUNTER — APPOINTMENT (OUTPATIENT)
Dept: OBGYN | Facility: HOSPITAL | Age: 28
End: 2023-05-18

## 2023-05-18 VITALS
TEMPERATURE: 98 F | WEIGHT: 225.31 LBS | HEIGHT: 65 IN | BODY MASS INDEX: 37.54 KG/M2 | DIASTOLIC BLOOD PRESSURE: 61 MMHG | HEART RATE: 72 BPM | SYSTOLIC BLOOD PRESSURE: 129 MMHG

## 2023-05-18 DIAGNOSIS — Z78.9 OTHER SPECIFIED HEALTH STATUS: ICD-10-CM

## 2023-05-18 RX ORDER — LEVONORGESTREL AND ETHINYL ESTRADIOL 0.1-0.02MG
0.1-2 KIT ORAL DAILY
Qty: 28 | Refills: 5 | Status: ACTIVE | COMMUNITY
Start: 2023-05-18 | End: 1900-01-01

## 2023-05-18 NOTE — DISCUSSION/SUMMARY
[FreeTextEntry1] : Family planning\par --Lessina refilled for 6 months\par --ACHES and correct usage reviewed\par --alternate forms of birth control reviewed\par --STD counseling, safe sex practices and condoms\par Follow up for annual in 6 months

## 2023-05-18 NOTE — HISTORY OF PRESENT ILLNESS
[FreeTextEntry1] : 27 yo  LMP 5//2023 here for OCP refill.  Taking Lessina without problems.  Denies headaches, chest pain or leg pains.  Reports regular cycles.  Happy with method and wants to continue use.  Monogamous with one male partner.  Hx preeclampsia during labor but normotensive after birth.  Today has normal /61 and lost 2 pounds.

## 2023-05-19 DIAGNOSIS — Z30.09 ENCOUNTER FOR OTHER GENERAL COUNSELING AND ADVICE ON CONTRACEPTION: ICD-10-CM

## 2023-05-19 DIAGNOSIS — Z78.9 OTHER SPECIFIED HEALTH STATUS: ICD-10-CM

## 2023-11-16 ENCOUNTER — RESULT REVIEW (OUTPATIENT)
Age: 28
End: 2023-11-16

## 2023-11-16 ENCOUNTER — APPOINTMENT (OUTPATIENT)
Dept: OBGYN | Facility: HOSPITAL | Age: 28
End: 2023-11-16
Payer: MEDICAID

## 2023-11-16 ENCOUNTER — OUTPATIENT (OUTPATIENT)
Dept: OUTPATIENT SERVICES | Facility: HOSPITAL | Age: 28
LOS: 1 days | End: 2023-11-16

## 2023-11-16 VITALS
DIASTOLIC BLOOD PRESSURE: 62 MMHG | HEART RATE: 79 BPM | SYSTOLIC BLOOD PRESSURE: 111 MMHG | TEMPERATURE: 97.9 F | BODY MASS INDEX: 39.32 KG/M2 | WEIGHT: 236 LBS | HEIGHT: 65 IN

## 2023-11-16 DIAGNOSIS — Z01.419 ENCOUNTER FOR GYNECOLOGICAL EXAMINATION (GENERAL) (ROUTINE) W/OUT ABNORMAL FINDINGS: ICD-10-CM

## 2023-11-16 DIAGNOSIS — Z30.09 ENCOUNTER FOR OTHER GENERAL COUNSELING AND ADVICE ON CONTRACEPTION: ICD-10-CM

## 2023-11-16 LAB
A1C WITH ESTIMATED AVERAGE GLUCOSE RESULT: 5.4 % — SIGNIFICANT CHANGE UP (ref 4–5.6)
A1C WITH ESTIMATED AVERAGE GLUCOSE RESULT: 5.4 % — SIGNIFICANT CHANGE UP (ref 4–5.6)
ALBUMIN SERPL ELPH-MCNC: 3.6 G/DL — SIGNIFICANT CHANGE UP (ref 3.3–5)
ALBUMIN SERPL ELPH-MCNC: 3.6 G/DL — SIGNIFICANT CHANGE UP (ref 3.3–5)
ALP SERPL-CCNC: 100 U/L — SIGNIFICANT CHANGE UP (ref 40–120)
ALP SERPL-CCNC: 100 U/L — SIGNIFICANT CHANGE UP (ref 40–120)
ALT FLD-CCNC: 16 U/L — SIGNIFICANT CHANGE UP (ref 4–33)
ALT FLD-CCNC: 16 U/L — SIGNIFICANT CHANGE UP (ref 4–33)
ANION GAP SERPL CALC-SCNC: 6 MMOL/L — LOW (ref 7–14)
ANION GAP SERPL CALC-SCNC: 6 MMOL/L — LOW (ref 7–14)
AST SERPL-CCNC: 14 U/L — SIGNIFICANT CHANGE UP (ref 4–32)
AST SERPL-CCNC: 14 U/L — SIGNIFICANT CHANGE UP (ref 4–32)
BILIRUB SERPL-MCNC: 0.4 MG/DL — SIGNIFICANT CHANGE UP (ref 0.2–1.2)
BILIRUB SERPL-MCNC: 0.4 MG/DL — SIGNIFICANT CHANGE UP (ref 0.2–1.2)
BUN SERPL-MCNC: 9 MG/DL — SIGNIFICANT CHANGE UP (ref 7–23)
BUN SERPL-MCNC: 9 MG/DL — SIGNIFICANT CHANGE UP (ref 7–23)
CALCIUM SERPL-MCNC: 8.3 MG/DL — LOW (ref 8.4–10.5)
CALCIUM SERPL-MCNC: 8.3 MG/DL — LOW (ref 8.4–10.5)
CHLORIDE SERPL-SCNC: 108 MMOL/L — HIGH (ref 98–107)
CHLORIDE SERPL-SCNC: 108 MMOL/L — HIGH (ref 98–107)
CO2 SERPL-SCNC: 25 MMOL/L — SIGNIFICANT CHANGE UP (ref 22–31)
CO2 SERPL-SCNC: 25 MMOL/L — SIGNIFICANT CHANGE UP (ref 22–31)
CREAT SERPL-MCNC: 0.61 MG/DL — SIGNIFICANT CHANGE UP (ref 0.5–1.3)
CREAT SERPL-MCNC: 0.61 MG/DL — SIGNIFICANT CHANGE UP (ref 0.5–1.3)
EGFR: 124 ML/MIN/1.73M2 — SIGNIFICANT CHANGE UP
EGFR: 124 ML/MIN/1.73M2 — SIGNIFICANT CHANGE UP
ESTIMATED AVERAGE GLUCOSE: 108 — SIGNIFICANT CHANGE UP
ESTIMATED AVERAGE GLUCOSE: 108 — SIGNIFICANT CHANGE UP
GLUCOSE SERPL-MCNC: 87 MG/DL — SIGNIFICANT CHANGE UP (ref 70–99)
GLUCOSE SERPL-MCNC: 87 MG/DL — SIGNIFICANT CHANGE UP (ref 70–99)
HCT VFR BLD CALC: 36.5 % — SIGNIFICANT CHANGE UP (ref 34.5–45)
HCT VFR BLD CALC: 36.5 % — SIGNIFICANT CHANGE UP (ref 34.5–45)
HGB BLD-MCNC: 12 G/DL — SIGNIFICANT CHANGE UP (ref 11.5–15.5)
HGB BLD-MCNC: 12 G/DL — SIGNIFICANT CHANGE UP (ref 11.5–15.5)
HIV 1+2 AB+HIV1 P24 AG SERPL QL IA: SIGNIFICANT CHANGE UP
HIV 1+2 AB+HIV1 P24 AG SERPL QL IA: SIGNIFICANT CHANGE UP
MCHC RBC-ENTMCNC: 27.6 PG — SIGNIFICANT CHANGE UP (ref 27–34)
MCHC RBC-ENTMCNC: 27.6 PG — SIGNIFICANT CHANGE UP (ref 27–34)
MCHC RBC-ENTMCNC: 32.9 GM/DL — SIGNIFICANT CHANGE UP (ref 32–36)
MCHC RBC-ENTMCNC: 32.9 GM/DL — SIGNIFICANT CHANGE UP (ref 32–36)
MCV RBC AUTO: 84.1 FL — SIGNIFICANT CHANGE UP (ref 80–100)
MCV RBC AUTO: 84.1 FL — SIGNIFICANT CHANGE UP (ref 80–100)
NRBC # BLD: 0 /100 WBCS — SIGNIFICANT CHANGE UP (ref 0–0)
NRBC # BLD: 0 /100 WBCS — SIGNIFICANT CHANGE UP (ref 0–0)
NRBC # FLD: 0 K/UL — SIGNIFICANT CHANGE UP (ref 0–0)
NRBC # FLD: 0 K/UL — SIGNIFICANT CHANGE UP (ref 0–0)
PLATELET # BLD AUTO: 268 K/UL — SIGNIFICANT CHANGE UP (ref 150–400)
PLATELET # BLD AUTO: 268 K/UL — SIGNIFICANT CHANGE UP (ref 150–400)
POTASSIUM SERPL-MCNC: 3.9 MMOL/L — SIGNIFICANT CHANGE UP (ref 3.5–5.3)
POTASSIUM SERPL-MCNC: 3.9 MMOL/L — SIGNIFICANT CHANGE UP (ref 3.5–5.3)
POTASSIUM SERPL-SCNC: 3.9 MMOL/L — SIGNIFICANT CHANGE UP (ref 3.5–5.3)
POTASSIUM SERPL-SCNC: 3.9 MMOL/L — SIGNIFICANT CHANGE UP (ref 3.5–5.3)
PROT SERPL-MCNC: 7.1 G/DL — SIGNIFICANT CHANGE UP (ref 6–8.3)
PROT SERPL-MCNC: 7.1 G/DL — SIGNIFICANT CHANGE UP (ref 6–8.3)
RBC # BLD: 4.34 M/UL — SIGNIFICANT CHANGE UP (ref 3.8–5.2)
RBC # BLD: 4.34 M/UL — SIGNIFICANT CHANGE UP (ref 3.8–5.2)
RBC # FLD: 13.2 % — SIGNIFICANT CHANGE UP (ref 10.3–14.5)
RBC # FLD: 13.2 % — SIGNIFICANT CHANGE UP (ref 10.3–14.5)
SODIUM SERPL-SCNC: 139 MMOL/L — SIGNIFICANT CHANGE UP (ref 135–145)
SODIUM SERPL-SCNC: 139 MMOL/L — SIGNIFICANT CHANGE UP (ref 135–145)
TSH SERPL-MCNC: 1.56 UIU/ML — SIGNIFICANT CHANGE UP (ref 0.27–4.2)
TSH SERPL-MCNC: 1.56 UIU/ML — SIGNIFICANT CHANGE UP (ref 0.27–4.2)
WBC # BLD: 6.42 K/UL — SIGNIFICANT CHANGE UP (ref 3.8–10.5)
WBC # BLD: 6.42 K/UL — SIGNIFICANT CHANGE UP (ref 3.8–10.5)
WBC # FLD AUTO: 6.42 K/UL — SIGNIFICANT CHANGE UP (ref 3.8–10.5)
WBC # FLD AUTO: 6.42 K/UL — SIGNIFICANT CHANGE UP (ref 3.8–10.5)

## 2023-11-16 PROCEDURE — 99395 PREV VISIT EST AGE 18-39: CPT | Mod: 25

## 2023-11-16 RX ORDER — LEVONORGESTREL AND ETHINYL ESTRADIOL 0.1-0.02MG
0.1-2 KIT ORAL
Qty: 3 | Refills: 1 | Status: COMPLETED | COMMUNITY
Start: 2023-11-16 | End: 2024-05-02

## 2023-11-17 DIAGNOSIS — Z30.09 ENCOUNTER FOR OTHER GENERAL COUNSELING AND ADVICE ON CONTRACEPTION: ICD-10-CM

## 2023-11-17 DIAGNOSIS — Z01.419 ENCOUNTER FOR GYNECOLOGICAL EXAMINATION (GENERAL) (ROUTINE) WITHOUT ABNORMAL FINDINGS: ICD-10-CM

## 2023-11-17 LAB
C TRACH RRNA SPEC QL NAA+PROBE: SIGNIFICANT CHANGE UP
C TRACH RRNA SPEC QL NAA+PROBE: SIGNIFICANT CHANGE UP
HBV SURFACE AG SER-ACNC: SIGNIFICANT CHANGE UP
HBV SURFACE AG SER-ACNC: SIGNIFICANT CHANGE UP
HCV AB S/CO SERPL IA: 0.27 S/CO — SIGNIFICANT CHANGE UP (ref 0–0.99)
HCV AB S/CO SERPL IA: 0.27 S/CO — SIGNIFICANT CHANGE UP (ref 0–0.99)
HCV AB SERPL-IMP: SIGNIFICANT CHANGE UP
HCV AB SERPL-IMP: SIGNIFICANT CHANGE UP
N GONORRHOEA RRNA SPEC QL NAA+PROBE: SIGNIFICANT CHANGE UP
N GONORRHOEA RRNA SPEC QL NAA+PROBE: SIGNIFICANT CHANGE UP
SPECIMEN SOURCE: SIGNIFICANT CHANGE UP
SPECIMEN SOURCE: SIGNIFICANT CHANGE UP
T PALLIDUM AB TITR SER: NEGATIVE — SIGNIFICANT CHANGE UP
T PALLIDUM AB TITR SER: NEGATIVE — SIGNIFICANT CHANGE UP

## 2023-11-20 LAB
CYTOLOGY SPEC DOC CYTO: SIGNIFICANT CHANGE UP
CYTOLOGY SPEC DOC CYTO: SIGNIFICANT CHANGE UP

## 2024-05-09 ENCOUNTER — APPOINTMENT (OUTPATIENT)
Dept: OBGYN | Facility: HOSPITAL | Age: 29
End: 2024-05-09

## 2024-10-03 NOTE — DISCHARGE NOTE OB - CARE PROVIDER_API CALL
Stable [facility],   [facility], Ambulatory Care Unit  Oncology Building, Basement floor  [phone] [place] [address]  [phone]  Phone: (   )    -  Fax: (   )    -  Follow Up Time:

## 2024-12-12 ENCOUNTER — RESULT REVIEW (OUTPATIENT)
Age: 29
End: 2024-12-12

## 2024-12-12 ENCOUNTER — APPOINTMENT (OUTPATIENT)
Dept: OBGYN | Facility: HOSPITAL | Age: 29
End: 2024-12-12
Payer: MEDICAID

## 2024-12-12 ENCOUNTER — OUTPATIENT (OUTPATIENT)
Dept: OUTPATIENT SERVICES | Facility: HOSPITAL | Age: 29
LOS: 1 days | End: 2024-12-12

## 2024-12-12 VITALS
HEIGHT: 65 IN | DIASTOLIC BLOOD PRESSURE: 89 MMHG | SYSTOLIC BLOOD PRESSURE: 107 MMHG | HEART RATE: 76 BPM | WEIGHT: 247 LBS | BODY MASS INDEX: 41.15 KG/M2 | TEMPERATURE: 97.5 F

## 2024-12-12 DIAGNOSIS — Z01.419 ENCOUNTER FOR GYNECOLOGICAL EXAMINATION (GENERAL) (ROUTINE) W/OUT ABNORMAL FINDINGS: ICD-10-CM

## 2024-12-12 DIAGNOSIS — E66.01 MORBID (SEVERE) OBESITY DUE TO EXCESS CALORIES: ICD-10-CM

## 2024-12-12 DIAGNOSIS — Z30.09 ENCOUNTER FOR OTHER GENERAL COUNSELING AND ADVICE ON CONTRACEPTION: ICD-10-CM

## 2024-12-12 LAB
A1C WITH ESTIMATED AVERAGE GLUCOSE RESULT: 5.8 % — HIGH (ref 4–5.6)
ANION GAP SERPL CALC-SCNC: 11 MMOL/L — SIGNIFICANT CHANGE UP (ref 7–14)
BUN SERPL-MCNC: 8 MG/DL — SIGNIFICANT CHANGE UP (ref 7–23)
CALCIUM SERPL-MCNC: 8.2 MG/DL — LOW (ref 8.4–10.5)
CHLORIDE SERPL-SCNC: 108 MMOL/L — HIGH (ref 98–107)
CO2 SERPL-SCNC: 23 MMOL/L — SIGNIFICANT CHANGE UP (ref 22–31)
CREAT SERPL-MCNC: 0.48 MG/DL — LOW (ref 0.5–1.3)
EGFR: 131 ML/MIN/1.73M2 — SIGNIFICANT CHANGE UP
ESTIMATED AVERAGE GLUCOSE: 120 — SIGNIFICANT CHANGE UP
GLUCOSE SERPL-MCNC: 93 MG/DL — SIGNIFICANT CHANGE UP (ref 70–99)
HCT VFR BLD CALC: 34.6 % — SIGNIFICANT CHANGE UP (ref 34.5–45)
HGB BLD-MCNC: 11.4 G/DL — LOW (ref 11.5–15.5)
HIV 1+2 AB+HIV1 P24 AG SERPL QL IA: SIGNIFICANT CHANGE UP
MCHC RBC-ENTMCNC: 26.6 PG — LOW (ref 27–34)
MCHC RBC-ENTMCNC: 32.9 G/DL — SIGNIFICANT CHANGE UP (ref 32–36)
MCV RBC AUTO: 80.8 FL — SIGNIFICANT CHANGE UP (ref 80–100)
NRBC # BLD: 0 /100 WBCS — SIGNIFICANT CHANGE UP (ref 0–0)
NRBC # FLD: 0 K/UL — SIGNIFICANT CHANGE UP (ref 0–0)
PLATELET # BLD AUTO: 320 K/UL — SIGNIFICANT CHANGE UP (ref 150–400)
POTASSIUM SERPL-MCNC: 3.4 MMOL/L — LOW (ref 3.5–5.3)
POTASSIUM SERPL-SCNC: 3.4 MMOL/L — LOW (ref 3.5–5.3)
RBC # BLD: 4.28 M/UL — SIGNIFICANT CHANGE UP (ref 3.8–5.2)
RBC # FLD: 14.6 % — HIGH (ref 10.3–14.5)
SODIUM SERPL-SCNC: 142 MMOL/L — SIGNIFICANT CHANGE UP (ref 135–145)
TSH SERPL-MCNC: 1.08 UIU/ML — SIGNIFICANT CHANGE UP (ref 0.27–4.2)
WBC # BLD: 5.24 K/UL — SIGNIFICANT CHANGE UP (ref 3.8–10.5)
WBC # FLD AUTO: 5.24 K/UL — SIGNIFICANT CHANGE UP (ref 3.8–10.5)

## 2024-12-12 PROCEDURE — 99395 PREV VISIT EST AGE 18-39: CPT | Mod: 25

## 2024-12-13 DIAGNOSIS — Z30.09 ENCOUNTER FOR OTHER GENERAL COUNSELING AND ADVICE ON CONTRACEPTION: ICD-10-CM

## 2024-12-13 DIAGNOSIS — E66.01 MORBID (SEVERE) OBESITY DUE TO EXCESS CALORIES: ICD-10-CM

## 2024-12-13 DIAGNOSIS — Z01.419 ENCOUNTER FOR GYNECOLOGICAL EXAMINATION (GENERAL) (ROUTINE) WITHOUT ABNORMAL FINDINGS: ICD-10-CM

## 2024-12-13 LAB
C TRACH RRNA SPEC QL NAA+PROBE: SIGNIFICANT CHANGE UP
HBV SURFACE AG SER-ACNC: SIGNIFICANT CHANGE UP
HCV AB S/CO SERPL IA: 0.24 S/CO — SIGNIFICANT CHANGE UP (ref 0–0.99)
HCV AB SERPL-IMP: SIGNIFICANT CHANGE UP
N GONORRHOEA RRNA SPEC QL NAA+PROBE: SIGNIFICANT CHANGE UP
SPECIMEN SOURCE: SIGNIFICANT CHANGE UP
T PALLIDUM AB TITR SER: NEGATIVE — SIGNIFICANT CHANGE UP

## 2025-01-15 NOTE — OB PROVIDER TRIAGE NOTE - NS_FETALMOVEMENT_OBGYN_ALL_OB
Patient was called. Stated \"the number you have dialed is not authorized\". Will try calling again later this week.    Present, unchanged

## 2025-06-12 ENCOUNTER — APPOINTMENT (OUTPATIENT)
Dept: OBGYN | Facility: HOSPITAL | Age: 30
End: 2025-06-12